# Patient Record
Sex: FEMALE | Race: WHITE | NOT HISPANIC OR LATINO | Employment: FULL TIME | URBAN - METROPOLITAN AREA
[De-identification: names, ages, dates, MRNs, and addresses within clinical notes are randomized per-mention and may not be internally consistent; named-entity substitution may affect disease eponyms.]

---

## 2020-11-27 PROCEDURE — U0003 INFECTIOUS AGENT DETECTION BY NUCLEIC ACID (DNA OR RNA); SEVERE ACUTE RESPIRATORY SYNDROME CORONAVIRUS 2 (SARS-COV-2) (CORONAVIRUS DISEASE [COVID-19]), AMPLIFIED PROBE TECHNIQUE, MAKING USE OF HIGH THROUGHPUT TECHNOLOGIES AS DESCRIBED BY CMS-2020-01-R: HCPCS | Performed by: FAMILY MEDICINE

## 2021-08-25 ENCOUNTER — APPOINTMENT (OUTPATIENT)
Dept: GENERAL RADIOLOGY | Facility: HOSPITAL | Age: 31
End: 2021-08-25

## 2021-08-25 ENCOUNTER — HOSPITAL ENCOUNTER (EMERGENCY)
Facility: HOSPITAL | Age: 31
Discharge: HOME | End: 2021-08-25
Admitting: EMERGENCY MEDICINE

## 2021-08-25 VITALS
OXYGEN SATURATION: 100 % | WEIGHT: 180.78 LBS | HEIGHT: 62 IN | DIASTOLIC BLOOD PRESSURE: 85 MMHG | SYSTOLIC BLOOD PRESSURE: 130 MMHG | RESPIRATION RATE: 18 BRPM | HEART RATE: 89 BPM | BODY MASS INDEX: 33.27 KG/M2 | TEMPERATURE: 99 F

## 2021-08-25 DIAGNOSIS — T18.9XXA FOREIGN BODY INGESTION, INITIAL ENCOUNTER: Primary | ICD-10-CM

## 2021-08-25 PROCEDURE — 99282 EMERGENCY DEPT VISIT SF MDM: CPT

## 2021-08-25 PROCEDURE — 74018 RADEX ABDOMEN 1 VIEW: CPT

## 2021-08-25 PROCEDURE — 71045 X-RAY EXAM CHEST 1 VIEW: CPT

## 2021-08-25 NOTE — ED PROVIDER NOTES
Subjective   History: Patient is a 30-year-old female who thinks she may possibly have ingested glass from her cup.  Reports she started drinking water out of her cup with a straw at 9 AM.  She noticed at 11:36 AM there were shards of glass in the bottom of the cup where it had cracked.  She drank approximately 4 to 6 ounces of water.  Reports she ate breakfast she has had no issues the remainder of the day.  No chest pain short of breath abdominal pain no bloody black tarry stool has not been nauseated or coughed up any blood.  Denies blood thinners.      Onset: 11:36 AM  Location:   Duration: Episodic  Character: Possibly swallowed foreign body, glass none  Aggravating/Alleviating factors: None  Radiation not applicable  Severity:             Review of Systems   Constitutional: Negative for chills, fatigue and fever.   HENT: Negative for congestion, sore throat, tinnitus and trouble swallowing.    Eyes: Negative for photophobia, discharge and visual disturbance.   Respiratory: Negative for cough and shortness of breath.    Cardiovascular: Negative for chest pain.   Gastrointestinal: Negative for abdominal pain, blood in stool, diarrhea, nausea and vomiting.   Genitourinary: Negative for dysuria, frequency and urgency.   Musculoskeletal: Negative for back pain and myalgias.   Skin: Negative for rash.   Neurological: Negative for dizziness and headaches.   Psychiatric/Behavioral: Negative for confusion.       No past medical history on file.    Allergies   Allergen Reactions   • Augmentin [Amoxicillin-Pot Clavulanate] Rash       No past surgical history on file.    No family history on file.    Social History     Socioeconomic History   • Marital status:      Spouse name: Not on file   • Number of children: Not on file   • Years of education: Not on file   • Highest education level: Not on file   Tobacco Use   • Smoking status: Never Smoker   • Smokeless tobacco: Never Used           Objective   Physical  "Exam  Vitals reviewed.   Constitutional:       General: She is not in acute distress.     Appearance: She is normal weight. She is not toxic-appearing.   HENT:      Head: Normocephalic and atraumatic.      Right Ear: Tympanic membrane normal.      Left Ear: Tympanic membrane normal.      Mouth/Throat:      Mouth: Mucous membranes are moist.      Pharynx: Oropharynx is clear.      Comments: No lesions abrasions or lacerations to oral mucosa visualized  Eyes:      Pupils: Pupils are equal, round, and reactive to light.   Cardiovascular:      Rate and Rhythm: Tachycardia present.      Pulses: Normal pulses.      Heart sounds: Normal heart sounds. No murmur heard.     Pulmonary:      Effort: Pulmonary effort is normal.      Breath sounds: Normal breath sounds.   Abdominal:      General: Abdomen is flat. There is no distension.      Tenderness: There is no abdominal tenderness. There is no guarding.   Musculoskeletal:         General: Normal range of motion.      Cervical back: Normal range of motion.   Skin:     General: Skin is warm and dry.      Findings: No rash.   Neurological:      Mental Status: She is alert and oriented to person, place, and time.   Psychiatric:         Mood and Affect: Mood normal.         Behavior: Behavior normal.         Thought Content: Thought content normal.         Judgment: Judgment normal.         Procedures           ED Course      /91 (BP Location: Left arm, Patient Position: Sitting)   Pulse 101   Temp 99 °F (37.2 °C) (Oral)   Resp 18   Ht 157.5 cm (62\")   Wt 82 kg (180 lb 12.4 oz)   SpO2 100%   BMI 33.06 kg/m²   Labs Reviewed - No data to display  Medications - No data to display  XR Chest 1 View    Result Date: 8/25/2021  No acute cardiopulmonary process.  Electronically Signed By-Durga Palomino MD On:8/25/2021 3:48 PM This report was finalized on [id number] by  Durga Palomino MD.    XR Abdomen KUB    Result Date: 8/25/2021  No definite radiopaque foreign body within " the abdomen or pelvis.  Electronically Signed By-Samir Madrigal MD On:8/25/2021 3:50 PM This report was finalized on [id number] by  Samir Madrigal MD.                                         MDM     I examined the patient using the appropriate personal protective equipment.      DISPOSITION:   Chart Review:  Comorbidity:  has no past medical history on file.    ECG: interpreted by ER physician and reviewed by myself: Not applicable  Labs: Not applicable    Imaging: Was interpreted by physician and reviewed by myself:  XR Chest 1 View    Result Date: 8/25/2021  No acute cardiopulmonary process.  Electronically Signed By-Durga Palomino MD On:8/25/2021 3:48 PM This report was finalized on [id number] by  Durga Palomino MD.    XR Abdomen KUB    Result Date: 8/25/2021  No definite radiopaque foreign body within the abdomen or pelvis.  Electronically Signed By-Samir Madrigal MD On:8/25/2021 3:50 PM This report was finalized on [id number] by  Samir Madrigal MD.      Disposition/Treatment:    Chest x-ray and KUB negative for radiopaque foreign body, both exams were normal.  Patient is asymptomatic, discussed emergent reasons to return to ER otherwise follow-up primary care provider.  Patient verbalized understanding agreeable plan of care.    Prescription: None    Final diagnoses:   Foreign body ingestion, initial encounter       ED Disposition  ED Disposition     ED Disposition Condition Comment    Discharge Stable           PATIENT CONNECTION - [facility] [id number]  [phone]             Medication List      No changes were made to your prescriptions during this visit.          Monica Kumar, APRN  08/25/21 1606

## 2021-08-25 NOTE — DISCHARGE INSTRUCTIONS
If you begin having chest pain short of breath abdominal pain black bloody tarry stool or coughing up blood return to ER otherwise follow-up primary care provider